# Patient Record
Sex: FEMALE | Race: WHITE | NOT HISPANIC OR LATINO | Employment: FULL TIME | URBAN - METROPOLITAN AREA
[De-identification: names, ages, dates, MRNs, and addresses within clinical notes are randomized per-mention and may not be internally consistent; named-entity substitution may affect disease eponyms.]

---

## 2023-10-05 ENCOUNTER — TELEPHONE (OUTPATIENT)
Dept: URGENT CARE | Facility: CLINIC | Age: 63
End: 2023-10-05

## 2023-10-05 ENCOUNTER — OFFICE VISIT (OUTPATIENT)
Dept: URGENT CARE | Facility: CLINIC | Age: 63
End: 2023-10-05
Payer: COMMERCIAL

## 2023-10-05 VITALS
TEMPERATURE: 97.8 F | OXYGEN SATURATION: 98 % | WEIGHT: 208 LBS | RESPIRATION RATE: 18 BRPM | HEART RATE: 80 BPM | SYSTOLIC BLOOD PRESSURE: 111 MMHG | DIASTOLIC BLOOD PRESSURE: 74 MMHG

## 2023-10-05 DIAGNOSIS — M54.50 ACUTE MIDLINE LOW BACK PAIN WITHOUT SCIATICA: Primary | ICD-10-CM

## 2023-10-05 DIAGNOSIS — M62.830 SPASM OF MUSCLE OF LOWER BACK: ICD-10-CM

## 2023-10-05 PROCEDURE — 99203 OFFICE O/P NEW LOW 30 MIN: CPT | Performed by: FAMILY MEDICINE

## 2023-10-05 PROCEDURE — 1036F TOBACCO NON-USER: CPT | Performed by: FAMILY MEDICINE

## 2023-10-05 RX ORDER — PREDNISONE 20 MG/1
TABLET ORAL
Qty: 10 TABLET | Refills: 0 | Status: SHIPPED | OUTPATIENT
Start: 2023-10-05

## 2023-10-05 RX ORDER — CYCLOBENZAPRINE HCL 10 MG
10 TABLET ORAL EVERY 8 HOURS PRN
Qty: 15 TABLET | Refills: 0 | Status: SHIPPED | OUTPATIENT
Start: 2023-10-05 | End: 2023-10-20

## 2023-10-05 RX ORDER — FLUOXETINE 10 MG/1
10 CAPSULE ORAL DAILY
COMMUNITY

## 2023-10-05 RX ORDER — LISINOPRIL AND HYDROCHLOROTHIAZIDE 12.5; 2 MG/1; MG/1
1 TABLET ORAL
COMMUNITY
Start: 2015-08-21

## 2023-10-05 ASSESSMENT — ENCOUNTER SYMPTOMS
HIP PAIN: 1
HEADACHES: 0
PERIANAL NUMBNESS: 0
ABDOMINAL PAIN: 0
WEIGHT LOSS: 0
PARESIS: 0
BACK PAIN: 1

## 2023-10-05 ASSESSMENT — PAIN SCALES - GENERAL: PAINLEVEL: 8

## 2023-10-05 NOTE — PATIENT INSTRUCTIONS
You have midline low back pain with muscle spasms.  Please take  prednisone as a single dose early in the day with food  May use Flexeril for muscle spasm. This medication may cause drowsiness. I recommend that you try Flexeril at the time when you are not doing tasks requiring alertness such as driving or operating machinery to see how it effects your alertness. Based on how drowsy it makes you, may have to limit its use to evening and bedtime hours only.  May use acetaminophen 325 mg, 2 tablets by mouth every 4-6 hours as needed for discomfort  May apply cold compresses to affected area 15-20 minutes daily to reduce swelling  If no better in 5-7 days please follow-up with primary care provider   If you have numbness, weakness, tingling in lower extremities or numbness or tingling in pelvic or rectal area or loss of bladder or bowel control please go to immediately to emergency department for further evaluation of this problem   this note was generated by voice recognition software. Minor transcription/grammatical errors may be present. Please call for clarification.

## 2023-10-05 NOTE — TELEPHONE ENCOUNTER
Pharmacist called and wanted to clarity prednisone amount. Dr. Weston ordered a tapered dose but the amount did not match the taper. Clarified that the taper dose is accurate and that the amount is 20 tabs not 10. Clarified per verbal order Dr Weston.

## 2023-10-06 NOTE — PROGRESS NOTES
"Subjective   Patient ID: Marisabel Flores is a 63 y.o. female.      Hip Pain  Back Pain     63-year-old  female presents with a complaint of \"sciatic pain\" since Sunday.  She states that it is worse on the right side.  She reports it began after she bent over to bring close out of her dryer.    The following portions of the chart were reviewed this encounter and updated as appropriate:         Review of Systems   Musculoskeletal:  Positive for back pain.       Patient denies nausea, vomiting, fever, chills, headache, ear pain, rhinorrhea, nasal congestion, sore throat, cough, wheezes, shortness of breath, chest pain, palpitations. Patient denies abdominal cramping, diarrhea or constipation. Denies dysuria or increased urinary frequency.  Objective   Physical Exam    Vital signs are reviewed. Alert and oriented x3 with normal mood and affect  Patient is well nourished, well-developed, alert and in no acute distress  No pain to palpation over frontal, ethmoid or maxillary sinus areas    External eyes, orbits, conjunctiva and eyelids are normal in appearance  Pupils are equal, round, reactive to light and accommodation, extraocular movements intact    External ears appear normal  External canals are normal in appearance  Right tympanic membrane is intact and pale gray in appearance  Left tympanic membrane is intact and pale gray in appearance  There is no middle ear effusion noted on the right  There is no middle ear effusion noted on the left  External appearance of the nose is normal  Nasal mucosa, septum, turbinates are  mildly swollen in appearance  There is  thin white nasal discharge in both nares    Oral mucosa is uniformly pink and moist  Palate is pink, symmetric and intact  Tongue is moist, mobile and midline  Posterior pharynx not erythematous with no concretions or exudates present  No cervical lymphadenopathy palpated    Heart has regular rate and rhythm. No murmurs, rubs or gallops are " auscultated at this exam.    Respiratory rate rhythm and effort are normal. Breath sounds bilaterally are clear on auscultation without crackles, rhonchi, wheezes or friction rub.    Abdomen: Normal bowel sounds on auscultation. Soft, nontender without rebound or rigidity on palpation     musculoskeletal: Patient denies pain to palpation over the thoracic and upper lumbar spinous processes.  She has some tenderness in the L3-S1 levels.  She complains of tenderness over the sacroiliac joint on the right side with accompanying paraspinal lumbar muscle spasm and ropey texture.  There is slight tenderness over the left sacroiliac joint and muscle spasm is palpated in the paraspinal lumbar muscles as well as ropey tight texture.  Patient complains of significant pain with palpation over the sciatic trunks right greater than left.  She has some discomfort extending down to the thigh.          Procedures    Assessment/Plan   Diagnoses and all orders for this visit:  Acute midline low back pain without sciatica  -     cyclobenzaprine (Flexeril) 10 mg tablet; Take 1 tablet (10 mg) by mouth every 8 hours if needed for muscle spasms for up to 15 days.  -     predniSONE (Deltasone) 20 mg tablet; 3 tabs p.o. daily x3 days, 2 tabs p.o. daily x3 days, 1 tab p.o. daily x3 days, one half tab p.o. daily x4 days  Spasm of muscle of lower back  -     cyclobenzaprine (Flexeril) 10 mg tablet; Take 1 tablet (10 mg) by mouth every 8 hours if needed for muscle spasms for up to 15 days.  -     predniSONE (Deltasone) 20 mg tablet; 3 tabs p.o. daily x3 days, 2 tabs p.o. daily x3 days, 1 tab p.o. daily x3 days, one half tab p.o. daily x4 days

## 2025-02-23 ENCOUNTER — ANCILLARY PROCEDURE (OUTPATIENT)
Dept: URGENT CARE | Age: 65
End: 2025-02-23
Payer: COMMERCIAL

## 2025-02-23 ENCOUNTER — OFFICE VISIT (OUTPATIENT)
Dept: URGENT CARE | Age: 65
End: 2025-02-23
Payer: COMMERCIAL

## 2025-02-23 VITALS
DIASTOLIC BLOOD PRESSURE: 79 MMHG | OXYGEN SATURATION: 96 % | WEIGHT: 200 LBS | TEMPERATURE: 98.7 F | SYSTOLIC BLOOD PRESSURE: 128 MMHG | RESPIRATION RATE: 16 BRPM | HEART RATE: 72 BPM | BODY MASS INDEX: 34.15 KG/M2 | HEIGHT: 64 IN

## 2025-02-23 DIAGNOSIS — M51.362 DEGENERATION OF INTERVERTEBRAL DISC OF LUMBAR REGION WITH DISCOGENIC BACK PAIN AND LOWER EXTREMITY PAIN: ICD-10-CM

## 2025-02-23 DIAGNOSIS — M54.50 LOW BACK PAIN POTENTIALLY ASSOCIATED WITH RADICULOPATHY: Primary | ICD-10-CM

## 2025-02-23 DIAGNOSIS — M54.50 LOW BACK PAIN POTENTIALLY ASSOCIATED WITH RADICULOPATHY: ICD-10-CM

## 2025-02-23 PROCEDURE — 99204 OFFICE O/P NEW MOD 45 MIN: CPT | Performed by: PHYSICIAN ASSISTANT

## 2025-02-23 PROCEDURE — 72100 X-RAY EXAM L-S SPINE 2/3 VWS: CPT | Performed by: PHYSICIAN ASSISTANT

## 2025-02-23 PROCEDURE — 3008F BODY MASS INDEX DOCD: CPT | Performed by: PHYSICIAN ASSISTANT

## 2025-02-23 PROCEDURE — 1036F TOBACCO NON-USER: CPT | Performed by: PHYSICIAN ASSISTANT

## 2025-02-23 RX ORDER — PREDNISONE 20 MG/1
TABLET ORAL
Qty: 20 TABLET | Refills: 0 | Status: SHIPPED | OUTPATIENT
Start: 2025-02-23 | End: 2025-03-04

## 2025-02-23 ASSESSMENT — PATIENT HEALTH QUESTIONNAIRE - PHQ9
2. FEELING DOWN, DEPRESSED OR HOPELESS: NOT AT ALL
SUM OF ALL RESPONSES TO PHQ9 QUESTIONS 1 AND 2: 0
1. LITTLE INTEREST OR PLEASURE IN DOING THINGS: NOT AT ALL

## 2025-02-23 ASSESSMENT — PAIN SCALES - GENERAL: PAINLEVEL_OUTOF10: 6

## 2025-02-23 NOTE — PATIENT INSTRUCTIONS
Follow up with your primary doctor about seeing a spine specialist. Continue muscle relaxer, Tylenol as needed for pain and take prednisone as prescribed.    Go to the ER if any bowel/bladder dysfunction including urinary retention or bowel incontinence, abdominal pain, numbness in the groin, leg weakness, fever/chills/sweats.

## 2025-02-23 NOTE — PROGRESS NOTES
"Subjective   Patient ID: Marisabel Flores is a 64 y.o. female. They present today with a chief complaint of Back Pain (Sciatic pain, left side, x several weeks but worse since friday).    Patient disposition: Home    HISTORY OF PRESENT ILLNESS:    [facility] adult with PMH recurrent episodes of sciatica, no lumbar imaging, presents for 3 wks of L-sided LBP with sciatica into the L buttock and thigh. Denies paresthesias. Pain 10/10 at worst currently mild. Denies any bowel/bladder dysfunction including urinary retention or bowel incontinence, denies abdominal pain, denies saddle paresthesias/anaesthesia, denies leg weakness, denies fever/chills/sweats, denies history of IV drug use or cancer.      Past Medical History  Allergies as of 02/23/2025    (No Known Allergies)       (Not in a hospital admission)       No past medical history on file.    No past surgical history on file.     reports that she has never smoked. She has never used smokeless tobacco.    Review of Systems    Negative except as documented in the History of Present Illness.                             Objective    Vitals:    02/23/25 1119   BP: 128/79   BP Location: Left arm   Pulse: 72   Resp: 16   Temp: 37.1 °C (98.7 °F)   SpO2: 96%   Weight: 90.7 kg (200 lb)   Height: 1.626 m (5' 4\")     No LMP recorded.      PHYSICAL EXAMINATION:    CONSTITUTIONAL: well-appearing, nontoxic    EYES:  No scleral icterus or orbital trauma noted. No conjunctival injection. PERRLA. EOMI b/l. No nystagmus.    HEENT:  Head and face are unremarkable and atraumatic. Mucous membranes moist. Nares are patent without copious rhinorrhea.  No lymphadenopathy.    LUNGS:  CTAB, no r/r/w. No dullness to percussion.    CARDIOVASCULAR:   Normal dorsalis pedis and posterior tibialis pulses bilaterally.    ABDOMEN:  Nontender, nondistended, with no obvious masses, and no peritoneal signs.  No bruits heard on auscultation, normal bowel sounds in 4 quadrants.     MUSCULOSKELETAL: Moving all " extremities. Ankle strength is 5 out of 5 bilateral flexion and extension.    The thoracic and cervical spine are nontender throughout.    Lumbosacral spine is    Straight leg raise is    Gait    SKIN:   Normal skin exam of the abdomen and back.    NEURO:  Normal baseline mental status. Patellar reflexes are normal bilaterally. No decreased motor or sensation in the distal lower extremities bilaterally. No saddle anesthesia.    PSYCH: Appropriate mood and affect.         ---------------------------------------------------------------         MDM:  XR interpreted by me independently shows mod-sev DDD, likely causative. Prednisone Rx provided and offered referral to ortho spine, but pt declined this preferring to talk to her PCP. The potential side effects of corticosteroid use were discussed at length with the patient. These risks include but are not limited to: difficulty sleeping, increased appetite, fluid retention, mood changes, weight gain, change in blood pressure, high blood glucose, possible adrenal suppression, osteoporosis, avascular necrosis of the hip, menstrual irregularities (if applicable), increased risk of infection, and cataracts. The patient understands these risks and is willing to proceed with steroid therapy.    The patient was screened for and is not exhibiting any of the concerning signs or symptoms associated with cauda equina syndrome in the history or on the examination today.        Procedures    Diagnostic study results (if any) were reviewed by Seymour Humphrey PA-C.    No results found for this visit on 02/23/25.     Assessment/Plan   Allergies, medications, history, and pertinent labs/EKGs/Imaging reviewed by Seymour Humphrey PA-C.     Orders and Diagnoses  Diagnoses and all orders for this visit:  Low back pain potentially associated with radiculopathy  -     XR lumbar spine 2-3 views; Future      Medical Admin Record      Follow Up Instructions  No follow-ups on file.    Electronically  signed by Seymour Humphrey PA-C  12:30 PM